# Patient Record
(demographics unavailable — no encounter records)

---

## 2024-11-14 NOTE — HISTORY OF PRESENT ILLNESS
[FreeTextEntry1] : Patient is here for hospital follow up   Patient was admitted to [facility] recently with a posterior neck cyst / abscess s/P I&D  Cultures with no growth   He was treated with Daptomycin  He had some leukocytosis with eosinophilia - probably secondary to Dapto   He is now off of Abxs

## 2024-11-14 NOTE — ASSESSMENT
[FreeTextEntry1] : Patient was admitted to [facility] recently with a posterior neck cyst / abscess s/P I&D  Cultures with no growth   He was treated with Daptomycin for a sufficient course  He had some leukocytosis with eosinophilia - probably secondary to Dapto   He is now off of Abxs  Rec:  1. CBC with diff and BMP today  2. Local wound care and packing. Surgery follow up   Will call patient with results  above d/w patient  [Treatment Education] : treatment education

## 2024-11-14 NOTE — PHYSICAL EXAM
[General Appearance - Alert] : alert [General Appearance - In No Acute Distress] : in no acute distress [General Appearance - Well Nourished] : well nourished [Sclera] : the sclera and conjunctiva were normal [Outer Ear] : the ears and nose were normal in appearance [Examination Of The Oral Cavity] : the lips and gums were normal [Neck Appearance] : the appearance of the neck was normal [Respiration, Rhythm And Depth] : normal respiratory rhythm and effort [Exaggerated Use Of Accessory Muscles For Inspiration] : no accessory muscle use [Auscultation Breath Sounds / Voice Sounds] : lungs were clear to auscultation bilaterally [Heart Rate And Rhythm] : heart rate was normal and rhythm regular [Heart Sounds] : normal S1 and S2 [Edema] : there was no peripheral edema [Bowel Sounds] : normal bowel sounds [Abdomen Soft] : soft [Musculoskeletal - Swelling] : no joint swelling [Range of Motion to Joints] : range of motion to joints [] : no rash [FreeTextEntry1] : as above  [Oriented To Time, Place, And Person] : oriented to person, place, and time [Affect] : the affect was normal

## 2024-11-14 NOTE — REASON FOR VISIT
[Post Hospitalization] : a post hospitalization visit [FreeTextEntry1] : fu from hospital stay ([facility]) for cyst on neck off abx doing dressing changes daily